# Patient Record
Sex: FEMALE | Race: WHITE | NOT HISPANIC OR LATINO
[De-identification: names, ages, dates, MRNs, and addresses within clinical notes are randomized per-mention and may not be internally consistent; named-entity substitution may affect disease eponyms.]

---

## 2019-09-23 PROBLEM — Z00.00 ENCOUNTER FOR PREVENTIVE HEALTH EXAMINATION: Status: ACTIVE | Noted: 2019-09-23

## 2019-10-11 ENCOUNTER — APPOINTMENT (OUTPATIENT)
Dept: OBGYN | Facility: CLINIC | Age: 64
End: 2019-10-11

## 2020-03-09 ENCOUNTER — OUTPATIENT (OUTPATIENT)
Dept: OUTPATIENT SERVICES | Facility: HOSPITAL | Age: 65
LOS: 1 days | Discharge: HOME | End: 2020-03-09

## 2020-03-10 DIAGNOSIS — H90.3 SENSORINEURAL HEARING LOSS, BILATERAL: ICD-10-CM

## 2021-02-22 ENCOUNTER — APPOINTMENT (OUTPATIENT)
Dept: SURGERY | Facility: CLINIC | Age: 66
End: 2021-02-22

## 2021-03-04 ENCOUNTER — TRANSCRIPTION ENCOUNTER (OUTPATIENT)
Age: 66
End: 2021-03-04

## 2023-09-07 ENCOUNTER — APPOINTMENT (OUTPATIENT)
Dept: ORTHOPEDIC SURGERY | Facility: CLINIC | Age: 68
End: 2023-09-07
Payer: MEDICARE

## 2023-09-07 VITALS — HEIGHT: 63 IN

## 2023-09-07 DIAGNOSIS — Z78.9 OTHER SPECIFIED HEALTH STATUS: ICD-10-CM

## 2023-09-07 PROCEDURE — 99203 OFFICE O/P NEW LOW 30 MIN: CPT

## 2023-09-07 PROCEDURE — L1902: CPT | Mod: RT

## 2023-09-07 RX ORDER — DIVALPROEX SODIUM 250 MG/1
TABLET, EXTENDED RELEASE ORAL
Refills: 0 | Status: ACTIVE | COMMUNITY

## 2023-09-07 RX ORDER — SERTRALINE HYDROCHLORIDE 25 MG/1
TABLET, FILM COATED ORAL
Refills: 0 | Status: ACTIVE | COMMUNITY

## 2023-09-07 NOTE — IMAGING
[de-identified] : On examination of her right ankle she has mild swelling, no erythema, no ecchymosis.  She has no tenderness over the medial or lateral malleolus.  She is tender over the ATFL.  No tenderness over the deltoid ligament.  No tenderness over the talar dome.  No tenderness over the Achilles or the calcaneus, negative Alcocer's test, no calf tenderness.  She has no tenderness over the midfoot or the metatarsals.  No tenderness over the Lisfranc joint.  She is able to dorsiflex and plantarflex, sensation is intact throughout, 2+ DP and PT pulses.  X-rays taken at the urgent care of the right ankle and right foot reviewed in the office today show no obvious fractures, dislocations, or other bony abnormalities.

## 2023-09-07 NOTE — HISTORY OF PRESENT ILLNESS
[de-identified] : 67-year-old female is here today for evaluation of her right ankle and foot.  Patient states about 2 weeks ago she was getting out of a transit van and stepped into a divot and rolled her ankle.  Since then she having pain and swelling in the ankle.  She went last week to the urgent care and had x-rays taken and was told she had a sprain.  She has been Ace wrapping it and using crutches but is still having pain in the ankle.  She denies any pain in the foot.  She denies any numbness tingling or any calf pain.

## 2023-09-07 NOTE — DISCUSSION/SUMMARY
[de-identified] : At this time I placed her in a lace up ankle brace.  She can weight-bear as tolerated. The patient understands that these injuries take 4-6 weeks to heal. The 1st 2 weeks are always the worst. Bruising and swelling is common for the next several weeks.  The more they are on their feet in a day, the more swollen they will be at the end of the day.   We will see her back in a few weeks for repeat evaluation if the pain is not improving. Patient will call me if any other problems or concerns.  Patient verbalized understanding and agreed with the plan, all questions were answered in the office today.

## 2023-09-13 ENCOUNTER — APPOINTMENT (OUTPATIENT)
Dept: ORTHOPEDIC SURGERY | Facility: CLINIC | Age: 68
End: 2023-09-13

## 2023-09-27 ENCOUNTER — APPOINTMENT (OUTPATIENT)
Dept: ORTHOPEDIC SURGERY | Facility: CLINIC | Age: 68
End: 2023-09-27
Payer: MEDICARE

## 2023-09-27 DIAGNOSIS — S93.401A SPRAIN OF UNSPECIFIED LIGAMENT OF RIGHT ANKLE, INITIAL ENCOUNTER: ICD-10-CM

## 2023-09-27 PROCEDURE — 99203 OFFICE O/P NEW LOW 30 MIN: CPT

## 2023-09-28 PROBLEM — S93.401A SPRAIN OF RIGHT ANKLE, INITIAL ENCOUNTER: Status: ACTIVE | Noted: 2023-09-07

## 2025-06-17 ENCOUNTER — APPOINTMENT (OUTPATIENT)
Dept: NEUROLOGY | Facility: CLINIC | Age: 70
End: 2025-06-17

## 2025-07-24 ENCOUNTER — APPOINTMENT (OUTPATIENT)
Dept: DERMATOLOGY | Facility: CLINIC | Age: 70
End: 2025-07-24